# Patient Record
Sex: MALE
[De-identification: names, ages, dates, MRNs, and addresses within clinical notes are randomized per-mention and may not be internally consistent; named-entity substitution may affect disease eponyms.]

---

## 2024-05-12 ENCOUNTER — APPOINTMENT (OUTPATIENT)
Dept: ORTHOPEDIC SURGERY | Facility: CLINIC | Age: 74
End: 2024-05-12
Payer: MEDICARE

## 2024-05-12 VITALS — HEIGHT: 67 IN | BODY MASS INDEX: 27.47 KG/M2 | WEIGHT: 175 LBS

## 2024-05-12 DIAGNOSIS — M51.36 OTHER INTERVERTEBRAL DISC DEGENERATION, LUMBAR REGION: ICD-10-CM

## 2024-05-12 DIAGNOSIS — M54.9 DORSALGIA, UNSPECIFIED: ICD-10-CM

## 2024-05-12 DIAGNOSIS — E78.00 PURE HYPERCHOLESTEROLEMIA, UNSPECIFIED: ICD-10-CM

## 2024-05-12 PROBLEM — Z00.00 ENCOUNTER FOR PREVENTIVE HEALTH EXAMINATION: Status: ACTIVE | Noted: 2024-05-12

## 2024-05-12 PROCEDURE — 72170 X-RAY EXAM OF PELVIS: CPT

## 2024-05-12 PROCEDURE — 72070 X-RAY EXAM THORAC SPINE 2VWS: CPT

## 2024-05-12 PROCEDURE — 72100 X-RAY EXAM L-S SPINE 2/3 VWS: CPT

## 2024-05-12 PROCEDURE — 99203 OFFICE O/P NEW LOW 30 MIN: CPT

## 2024-05-12 RX ORDER — ATORVASTATIN CALCIUM 80 MG/1
TABLET, FILM COATED ORAL
Refills: 0 | Status: ACTIVE | COMMUNITY

## 2024-05-12 RX ORDER — METHOCARBAMOL 750 MG/1
750 TABLET, FILM COATED ORAL TWICE DAILY
Qty: 60 | Refills: 0 | Status: ACTIVE | COMMUNITY
Start: 2024-05-12 | End: 1900-01-01

## 2024-05-12 RX ORDER — METHYLPREDNISOLONE 4 MG/1
4 TABLET ORAL
Qty: 1 | Refills: 0 | Status: ACTIVE | COMMUNITY
Start: 2024-05-12 | End: 1900-01-01

## 2024-05-12 RX ORDER — MELOXICAM 15 MG/1
15 TABLET ORAL
Qty: 30 | Refills: 1 | Status: ACTIVE | COMMUNITY
Start: 2024-05-12 | End: 1900-01-01

## 2024-05-12 NOTE — HISTORY OF PRESENT ILLNESS
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  Past medical history CLL  [] : no [FreeTextEntry3] : 1 week  [FreeTextEntry5] : no injury, patient has pain in back that travels into ribs, mainly right side

## 2024-05-12 NOTE — ASSESSMENT
[FreeTextEntry1] : - will start pt on Medrol pack, Robaxin, and course of physical therapy. -  mobic  to be started after completion of medrol pack. - f/u 3 weeks if pain persist would consider mri evaluation

## 2024-05-12 NOTE — IMAGING
[FreeTextEntry1] : Mild degenerative changes of the thoracic and lumbar spine with mild straightening consistent with spasm there are no [de-identified] : He has moderate arthritis of the right hip with cam lesion and osteophyte formation appreciated

## 2024-05-13 RX ORDER — TIZANIDINE 4 MG/1
4 TABLET ORAL
Qty: 30 | Refills: 0 | Status: ACTIVE | COMMUNITY
Start: 2024-05-13 | End: 1900-01-01

## 2024-09-25 ENCOUNTER — APPOINTMENT (OUTPATIENT)
Dept: ORTHOPEDIC SURGERY | Facility: CLINIC | Age: 74
End: 2024-09-25
Payer: MEDICARE

## 2024-09-25 ENCOUNTER — APPOINTMENT (OUTPATIENT)
Dept: MRI IMAGING | Facility: CLINIC | Age: 74
End: 2024-09-25
Payer: MEDICARE

## 2024-09-25 DIAGNOSIS — M51.36 OTHER INTERVERTEBRAL DISC DEGENERATION, LUMBAR REGION: ICD-10-CM

## 2024-09-25 PROCEDURE — 72146 MRI CHEST SPINE W/O DYE: CPT | Mod: MH

## 2024-09-25 PROCEDURE — 99214 OFFICE O/P EST MOD 30 MIN: CPT

## 2024-09-25 NOTE — ASSESSMENT
[FreeTextEntry1] : - will renew  the  Medrol pack, Robaxin, and course of physical therapy. -  mobic  to be started after completion of medrol pack. -Due to the fact that he has continued symptoms despite months of conservative care we will get an MRI of the thoracic region to evaluate any structural damage and he will follow-up with our spine team after the MRI for delineation of treatment plan

## 2024-09-25 NOTE — HISTORY OF PRESENT ILLNESS
[Mid-back] : mid-back [Dull/Aching] : dull/aching [de-identified] :   09/25/2024: We saw him for a mid and lower back pain in May.  He reports some improvement with the medication and course of physical therapy back then.  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  Past medical history CLL  [] : no [FreeTextEntry3] : 1 week  [FreeTextEntry5] : no injury, patient has pain in back that travels into ribs, mainly right side

## 2024-09-25 NOTE — IMAGING
[FreeTextEntry1] : Mild degenerative changes of the thoracic and lumbar spine with mild straightening consistent with spasm there are no [de-identified] : He has moderate arthritis of the right hip with cam lesion and osteophyte formation appreciated

## 2024-09-25 NOTE — PHYSICAL EXAM
[Flexion] : flexion [Extension] : extension [Bending to left] : bending to left [Bending to right] : bending to right [] : not mildly antalgic

## 2024-09-26 RX ORDER — TIZANIDINE 4 MG/1
4 TABLET ORAL
Qty: 60 | Refills: 0 | Status: ACTIVE | COMMUNITY
Start: 2024-09-26 | End: 1900-01-01

## 2024-10-04 ENCOUNTER — APPOINTMENT (OUTPATIENT)
Dept: ORTHOPEDIC SURGERY | Facility: CLINIC | Age: 74
End: 2024-10-04
Payer: MEDICARE

## 2024-10-04 DIAGNOSIS — M47.814 SPONDYLOSIS W/OUT MYELOPATHY OR RADICULOPATHY, THORACIC REGION: ICD-10-CM

## 2024-10-04 DIAGNOSIS — M54.9 DORSALGIA, UNSPECIFIED: ICD-10-CM

## 2024-10-04 DIAGNOSIS — M79.10 MYALGIA, UNSPECIFIED SITE: ICD-10-CM

## 2024-10-04 PROCEDURE — 20552 NJX 1/MLT TRIGGER POINT 1/2: CPT

## 2024-10-04 PROCEDURE — 99204 OFFICE O/P NEW MOD 45 MIN: CPT | Mod: 25

## 2024-10-04 PROCEDURE — J3490M: CUSTOM | Mod: NC

## 2024-10-04 NOTE — DISCUSSION/SUMMARY
[Medication Risks Reviewed] : Medication risks reviewed [de-identified] : reviewed the case and the imaging with the patient  middle back pain  discussion of the condition and treatment options cautions discussed questions answered discussion of natural history of the condition and what the next step would be conservative tx exercise

## 2024-10-04 NOTE — HISTORY OF PRESENT ILLNESS
[Mid-back] : mid-back [Dull/Aching] : dull/aching [] : no [FreeTextEntry3] : 1 week  [FreeTextEntry5] : no injury, patient has pain in back that travels into ribs, mainly right side  [de-identified] : 10/4/24 74 y.o patient is here for his Tspine today. Mri review ordered by Dominick. Pain in the middle back and lower back - legs okay   Mid and lower back pain   Has done PT Has tried medication   PMH CLL   MRi reviewed T spine -  spondylosis

## 2024-10-04 NOTE — HISTORY OF PRESENT ILLNESS
[Mid-back] : mid-back [Dull/Aching] : dull/aching [] : no [FreeTextEntry3] : 1 week  [FreeTextEntry5] : no injury, patient has pain in back that travels into ribs, mainly right side  [de-identified] : 10/4/24 74 y.o patient is here for his Tspine today. Mri review ordered by Dominick. Pain in the middle back and lower back - legs okay   Mid and lower back pain   Has done PT Has tried medication   PMH CLL   MRi reviewed T spine -  spondylosis

## 2024-10-04 NOTE — HISTORY OF PRESENT ILLNESS
[Mid-back] : mid-back [Dull/Aching] : dull/aching [] : no [FreeTextEntry3] : 1 week  [FreeTextEntry5] : no injury, patient has pain in back that travels into ribs, mainly right side  [de-identified] : 10/4/24 74 y.o patient is here for his Tspine today. Mri review ordered by Dominick. Pain in the middle back and lower back - legs okay   Mid and lower back pain   Has done PT Has tried medication   PMH CLL   MRi reviewed T spine -  spondylosis

## 2024-10-04 NOTE — HISTORY OF PRESENT ILLNESS
[Mid-back] : mid-back [Dull/Aching] : dull/aching [] : no [FreeTextEntry3] : 1 week  [FreeTextEntry5] : no injury, patient has pain in back that travels into ribs, mainly right side  [de-identified] : 10/4/24 74 y.o patient is here for his Tspine today. Mri review ordered by Dominick. Pain in the middle back and lower back - legs okay   Mid and lower back pain   Has done PT Has tried medication   PMH CLL   MRi reviewed T spine -  spondylosis

## 2024-10-04 NOTE — DISCUSSION/SUMMARY
[Medication Risks Reviewed] : Medication risks reviewed [de-identified] : reviewed the case and the imaging with the patient  middle back pain  discussion of the condition and treatment options cautions discussed questions answered discussion of natural history of the condition and what the next step would be conservative tx exercise

## 2024-10-04 NOTE — DISCUSSION/SUMMARY
[Medication Risks Reviewed] : Medication risks reviewed [de-identified] : reviewed the case and the imaging with the patient  middle back pain  discussion of the condition and treatment options cautions discussed questions answered discussion of natural history of the condition and what the next step would be conservative tx exercise

## 2024-10-04 NOTE — DISCUSSION/SUMMARY
[Medication Risks Reviewed] : Medication risks reviewed [de-identified] : reviewed the case and the imaging with the patient  middle back pain  discussion of the condition and treatment options cautions discussed questions answered discussion of natural history of the condition and what the next step would be conservative tx exercise